# Patient Record
Sex: MALE | Race: WHITE | URBAN - METROPOLITAN AREA
[De-identification: names, ages, dates, MRNs, and addresses within clinical notes are randomized per-mention and may not be internally consistent; named-entity substitution may affect disease eponyms.]

---

## 2019-01-29 ENCOUNTER — OFFICE VISIT (OUTPATIENT)
Dept: URBAN - METROPOLITAN AREA CLINIC 33 | Facility: CLINIC | Age: 84
End: 2019-01-29
Payer: MEDICARE

## 2019-01-29 PROCEDURE — 92134 CPTRZ OPH DX IMG PST SGM RTA: CPT | Performed by: OPHTHALMOLOGY

## 2019-01-29 PROCEDURE — 99204 OFFICE O/P NEW MOD 45 MIN: CPT | Performed by: OPHTHALMOLOGY

## 2019-01-29 RX ORDER — NEPAFENAC 3 MG/ML
0.3 % SUSPENSION OPHTHALMIC
Qty: 10 | Refills: 2 | Status: INACTIVE
Start: 2019-01-29 | End: 2019-08-20

## 2019-01-29 RX ORDER — MOXIFLOXACIN HYDROCHLORIDE 5 MG/ML
0.5 % SOLUTION/ DROPS OPHTHALMIC
Qty: 10 | Refills: 1 | Status: INACTIVE
Start: 2019-01-29 | End: 2019-02-19

## 2019-01-29 RX ORDER — DUREZOL 0.5 MG/ML
0.05 % EMULSION OPHTHALMIC
Qty: 10 | Refills: 2 | Status: INACTIVE
Start: 2019-01-29 | End: 2019-08-20

## 2019-01-29 ASSESSMENT — KERATOMETRY
OS: 45.13
OD: 44.88

## 2019-01-29 ASSESSMENT — INTRAOCULAR PRESSURE
OD: 22
OS: 22

## 2019-01-29 ASSESSMENT — VISUAL ACUITY
OS: 20/30
OD: 20/40

## 2019-01-29 NOTE — IMPRESSION/PLAN
Impression: Type 2 diabetes mellitus w/o complication: A70.0. Plan: no background retinopathy, no signs of neovascularization noted. Discussed ocular and systemic benefits of blood sugar control. Advised patient the importance of good BS control. RTC 1 year DFE PRN.

## 2019-01-29 NOTE — IMPRESSION/PLAN
Impression: Drusen (degenerative) of macula, bilateral: H35.363. Plan: Macular degeneration, dry type with stable vision. No fluid or heme noted. Will continue to monitor vision and the patient has been instructed to call with any vision changes. Amsler grid was given today. Advised patient it would be best to start AREDS. Patient is a non smoker. Advised patient to eat green leafy vegetables.

## 2019-01-29 NOTE — IMPRESSION/PLAN
Impression: Age-related nuclear cataract, bilateral: H25.13. Plan: Cataracts account for some decreased vision, however, the patient is aware with macular changes that level of vision post operatively cannot be determined. Cataracts are limiting vision. Discussed risks, benefits and alternatives to surgery including but not limited to: bleeding, infection, risk of vision loss, loss of the eye, need for other surgery. Patient voiced understanding and wishes to proceed. Patient desires surgery, recommend phacoemulsification with intraocular lens. If Durezol/Vigamox not covered by insurance, okay to switch to generic. RIGHT EYE THEN LEFT EYE
RL2 STANDARD LENS- No upgrades due to AMD
USE ILEVRO Pt will need ASCAN testing prior to Sx Pt understands will need glasses for BCVA after Sx.

## 2019-02-11 ENCOUNTER — TESTING ONLY (OUTPATIENT)
Dept: URBAN - METROPOLITAN AREA CLINIC 33 | Facility: CLINIC | Age: 84
End: 2019-02-11
Payer: MEDICARE

## 2019-02-11 DIAGNOSIS — H25.13 AGE-RELATED NUCLEAR CATARACT, BILATERAL: Primary | ICD-10-CM

## 2019-02-11 PROCEDURE — 92136 OPHTHALMIC BIOMETRY: CPT | Performed by: OPHTHALMOLOGY

## 2019-02-19 ENCOUNTER — SURGERY (OUTPATIENT)
Dept: URBAN - METROPOLITAN AREA SURGERY 15 | Facility: SURGERY | Age: 84
End: 2019-02-19
Payer: MEDICARE

## 2019-02-19 PROCEDURE — 66984 XCAPSL CTRC RMVL W/O ECP: CPT | Performed by: OPHTHALMOLOGY

## 2019-02-19 RX ORDER — OFLOXACIN 3 MG/ML
0.3 % SOLUTION/ DROPS OPHTHALMIC
Qty: 10 | Refills: 2 | Status: INACTIVE
Start: 2019-02-19 | End: 2019-08-20

## 2019-02-20 ENCOUNTER — POST-OPERATIVE VISIT (OUTPATIENT)
Dept: URBAN - METROPOLITAN AREA CLINIC 33 | Facility: CLINIC | Age: 84
End: 2019-02-20

## 2019-02-20 PROCEDURE — 99024 POSTOP FOLLOW-UP VISIT: CPT | Performed by: OPTOMETRIST

## 2019-02-20 ASSESSMENT — INTRAOCULAR PRESSURE
OS: 22
OD: 26

## 2019-02-26 ENCOUNTER — POST-OPERATIVE VISIT (OUTPATIENT)
Dept: URBAN - METROPOLITAN AREA CLINIC 33 | Facility: CLINIC | Age: 84
End: 2019-02-26

## 2019-02-26 PROCEDURE — 99024 POSTOP FOLLOW-UP VISIT: CPT | Performed by: OPTOMETRIST

## 2019-02-26 ASSESSMENT — INTRAOCULAR PRESSURE
OD: 26
OS: 19

## 2019-02-26 ASSESSMENT — VISUAL ACUITY: OD: 20/25

## 2019-03-12 ENCOUNTER — SURGERY (OUTPATIENT)
Dept: URBAN - METROPOLITAN AREA SURGERY 15 | Facility: SURGERY | Age: 84
End: 2019-03-12
Payer: MEDICARE

## 2019-03-12 PROCEDURE — 66984 XCAPSL CTRC RMVL W/O ECP: CPT | Performed by: OPHTHALMOLOGY

## 2019-03-13 ENCOUNTER — POST-OPERATIVE VISIT (OUTPATIENT)
Dept: URBAN - METROPOLITAN AREA CLINIC 33 | Facility: CLINIC | Age: 84
End: 2019-03-13

## 2019-03-13 DIAGNOSIS — Z09 ENCNTR FOR F/U EXAM AFT TRTMT FOR COND OTH THAN MALIG NEOPLM: Primary | ICD-10-CM

## 2019-03-13 PROCEDURE — 99024 POSTOP FOLLOW-UP VISIT: CPT | Performed by: OPTOMETRIST

## 2019-03-13 ASSESSMENT — INTRAOCULAR PRESSURE
OD: 17
OS: 22

## 2019-03-19 ENCOUNTER — POST-OPERATIVE VISIT (OUTPATIENT)
Dept: URBAN - METROPOLITAN AREA CLINIC 33 | Facility: CLINIC | Age: 84
End: 2019-03-19

## 2019-03-19 ASSESSMENT — VISUAL ACUITY
OS: 20/30
OD: 20/30-1

## 2019-03-19 ASSESSMENT — INTRAOCULAR PRESSURE
OD: 17
OS: 20

## 2019-08-20 ENCOUNTER — OFFICE VISIT (OUTPATIENT)
Dept: URBAN - METROPOLITAN AREA CLINIC 33 | Facility: CLINIC | Age: 84
End: 2019-08-20
Payer: MEDICARE

## 2019-08-20 DIAGNOSIS — Z96.1 PRESENCE OF PSEUDOPHAKIA: ICD-10-CM

## 2019-08-20 DIAGNOSIS — H43.813 VITREOUS DEGENERATION, BILATERAL: ICD-10-CM

## 2019-08-20 PROCEDURE — 99214 OFFICE O/P EST MOD 30 MIN: CPT | Performed by: OPHTHALMOLOGY

## 2019-08-20 PROCEDURE — 92136 OPHTHALMIC BIOMETRY: CPT | Performed by: OPHTHALMOLOGY

## 2020-02-18 ENCOUNTER — OFFICE VISIT (OUTPATIENT)
Dept: URBAN - METROPOLITAN AREA CLINIC 33 | Facility: CLINIC | Age: 85
End: 2020-02-18
Payer: MEDICARE

## 2020-02-18 DIAGNOSIS — H35.363 DRUSEN (DEGENERATIVE) OF MACULA, BILATERAL: ICD-10-CM

## 2020-02-18 DIAGNOSIS — E11.9 TYPE 2 DIABETES MELLITUS W/O COMPLICATION: ICD-10-CM

## 2020-02-18 PROCEDURE — 92134 CPTRZ OPH DX IMG PST SGM RTA: CPT | Performed by: OPHTHALMOLOGY

## 2020-02-18 PROCEDURE — 99214 OFFICE O/P EST MOD 30 MIN: CPT | Performed by: OPHTHALMOLOGY

## 2020-02-18 ASSESSMENT — INTRAOCULAR PRESSURE
OD: 17
OS: 17

## 2020-02-18 NOTE — IMPRESSION/PLAN
Impression: Presence of pseudophakia: Z96.1. OU. Plan: Well positioned PC IOL(s). Will continue to monitor.

## 2020-02-18 NOTE — IMPRESSION/PLAN
Impression: Type 2 diabetes mellitus w/o complication: O78.1. Plan: no background retinopathy, no signs of neovascularization noted. Discussed ocular and systemic benefits of blood sugar control. Advised patient the importance of good BS control. RTC 9 months DFE PRN.

## 2021-03-02 ENCOUNTER — OFFICE VISIT (OUTPATIENT)
Dept: URBAN - METROPOLITAN AREA CLINIC 33 | Facility: CLINIC | Age: 86
End: 2021-03-02
Payer: MEDICARE

## 2021-03-02 DIAGNOSIS — H04.123 DRY EYE SYNDROME OF BILATERAL LACRIMAL GLANDS: ICD-10-CM

## 2021-03-02 DIAGNOSIS — H26.491 OTHER SECONDARY CATARACT, RIGHT EYE: ICD-10-CM

## 2021-03-02 PROCEDURE — 92134 CPTRZ OPH DX IMG PST SGM RTA: CPT | Performed by: OPHTHALMOLOGY

## 2021-03-02 PROCEDURE — 92014 COMPRE OPH EXAM EST PT 1/>: CPT | Performed by: OPHTHALMOLOGY

## 2021-03-02 ASSESSMENT — VISUAL ACUITY
OS: 20/30
OD: 20/40

## 2021-03-02 ASSESSMENT — INTRAOCULAR PRESSURE
OD: 17
OS: 17

## 2021-03-02 NOTE — IMPRESSION/PLAN
Impression: Drusen (degenerative) of macula, bilateral: H35.363. Plan: stable, cont. areds and grid.   DFE in 1 year with OCT mac

## 2021-03-02 NOTE — IMPRESSION/PLAN
Impression: Other secondary cataract, right eye: H26.491. Plan: observe for now. Patient has no complaints. Possible yag cap next year.

## 2021-03-02 NOTE — IMPRESSION/PLAN
Impression: Type 2 diabetes mellitus w/o complication: N52.0. Plan: Diabetes type II: no background retinopathy, no signs of neovascularization noted. Discussed ocular and systemic benefits of blood sugar control.

## 2023-05-30 ENCOUNTER — OFFICE VISIT (OUTPATIENT)
Dept: URBAN - METROPOLITAN AREA CLINIC 33 | Facility: CLINIC | Age: 88
End: 2023-05-30
Payer: MEDICARE

## 2023-05-30 DIAGNOSIS — H26.491 OTHER SECONDARY CATARACT, RIGHT EYE: ICD-10-CM

## 2023-05-30 DIAGNOSIS — H43.813 VITREOUS DEGENERATION, BILATERAL: ICD-10-CM

## 2023-05-30 DIAGNOSIS — H35.363 DRUSEN (DEGENERATIVE) OF MACULA, BILATERAL: ICD-10-CM

## 2023-05-30 DIAGNOSIS — H04.123 DRY EYE SYNDROME OF BILATERAL LACRIMAL GLANDS: Primary | ICD-10-CM

## 2023-05-30 DIAGNOSIS — E11.9 TYPE 2 DIABETES MELLITUS W/O COMPLICATION: ICD-10-CM

## 2023-05-30 PROCEDURE — 92134 CPTRZ OPH DX IMG PST SGM RTA: CPT | Performed by: OPHTHALMOLOGY

## 2023-05-30 PROCEDURE — 99214 OFFICE O/P EST MOD 30 MIN: CPT | Performed by: OPHTHALMOLOGY

## 2023-05-30 ASSESSMENT — INTRAOCULAR PRESSURE
OS: 17
OD: 17

## 2023-05-30 ASSESSMENT — VISUAL ACUITY
OS: 20/30
OD: 20/30

## 2023-05-30 NOTE — IMPRESSION/PLAN
Impression: Drusen (degenerative) of macula, bilateral: H35.363. Plan: Macular degeneration, dry type with stable vision. No fluid or heme noted. Will continue to monitor vision and the patient has been instructed to call with any vision changes. Amsler grid was given today. Advised patient it would be best to continue AREDS. Patient is a non smoker. Advised patient to eat green leafy vegetables. 
RTC 1 year (DFE & Mac oct)

## 2023-05-30 NOTE — IMPRESSION/PLAN
Impression: Type 2 diabetes mellitus w/o complication: S23.4. Plan: no background retinopathy, no signs of neovascularization noted. Discussed ocular and systemic benefits of blood sugar control. Advised patient the importance of good BS control. RTC 1 year DFE PRN.

## 2023-05-30 NOTE — IMPRESSION/PLAN
Impression: Other secondary cataract, right eye: H26.491. Plan: Posterior capsular opacification present on lens implant, STABLE.